# Patient Record
Sex: MALE
[De-identification: names, ages, dates, MRNs, and addresses within clinical notes are randomized per-mention and may not be internally consistent; named-entity substitution may affect disease eponyms.]

---

## 2020-01-03 ENCOUNTER — HOSPITAL ENCOUNTER (EMERGENCY)
Dept: HOSPITAL 97 - ER | Age: 45
Discharge: HOME | End: 2020-01-03
Payer: COMMERCIAL

## 2020-01-03 VITALS — OXYGEN SATURATION: 99 % | TEMPERATURE: 98.7 F | DIASTOLIC BLOOD PRESSURE: 85 MMHG | SYSTOLIC BLOOD PRESSURE: 132 MMHG
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
## 2020-01-03 DIAGNOSIS — S30.0XXA: Primary | ICD-10-CM
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
## 2020-01-03 DIAGNOSIS — I10: ICD-10-CM

## 2020-01-03 PROCEDURE — 72170 X-RAY EXAM OF PELVIS: CPT

## 2020-01-03 PROCEDURE — 99284 EMERGENCY DEPT VISIT MOD MDM: CPT

## 2020-01-03 NOTE — RAD REPORT
EXAM DESCRIPTION:  RAD - Pelvis - 1/3/2020 4:53 pm

 

CLINICAL HISTORY:  Pelvic pain status post injury

 

FINDINGS:  No fracture or dislocation is seen.

 

If the patient continues to have symptoms to suggest an occult fracture then MRI would be recommended

## 2020-01-03 NOTE — ER
Nurse's Notes                                                                                     

 [facility]                                                                 

Name: Kiko Montanez                                                                           

Age: 44 yrs                                                                                       

Sex: Male                                                                                         

: 1975                                                                                   

MRN: [id number]                                                                                   

Arrival Date: 2020                                                                          

Time: 15:59                                                                                       

Account#: [id number]                                                                            

Bed 25                                                                                            

Private MD:                                                                                       

Diagnosis: Contusion of lower back and pelvis;Fall on same level from slipping, tripping and      

  stumbling                                                                                       

                                                                                                  

Presentation:                                                                                     

                                                                                             

16:06 Presenting complaint: Patient states: "I fell in the shower, 30-40 minutes ago and I    ss  

      hit right here above my hip and I just want to make sure I didn't mess anything up          

      inside." Also reports that he may have cut a toe on his L foot. Transition of care:         

      patient was not received from another setting of care. Onset of symptoms was 2020. Risk Assessment: Do you want to hurt yourself or someone else? Patient            

      reports no desire to harm self or others. Initial Sepsis Screen: Does the patient meet      

      any 2 criteria? No. Patient's initial sepsis screen is negative. Does the patient have      

      a suspected source of infection? No. Patient's initial sepsis screen is negative. Care      

      prior to arrival: None.                                                                     

16:06 Method Of Arrival: Wheelchair                                                           ss  

16:06 Acuity: GRABIEL 3                                                                           ss  

                                                                                                  

Historical:                                                                                       

- Allergies:                                                                                      

16:09 No Known Allergies;                                                                     ss  

- PMHx:                                                                                           

16:09 Hypertension; Diabetes - IDDM;                                                          ss  

- PSHx:                                                                                           

16:09 L foot;                                                                                 ss  

                                                                                                  

- Immunization history:: Adult Immunizations up to date.                                          

- Social history:: Smoking status: Patient/guardian denies using tobacco.                         

- Ebola Screening: : Patient denies exposure to infectious person Patient denies travel           

  to an Ebola-affected area in the 21 days before illness onset.                                  

                                                                                                  

                                                                                                  

Screenin:35 Abuse screen: Denies threats or abuse. Nutritional screening: No deficits noted.        em  

      Tuberculosis screening: No symptoms or risk factors identified. Fall Risk Fall in past      

      12 months (25 points). Total Flores Fall Scale indicates Low Risk Score (25-44 pts).         

      Side Rails Up X 2 Placed close to Nursing Station Family Present and informed to notify     

      staff if they need to leave bedside.                                                        

                                                                                                  

Assessment:                                                                                       

16:25 General: Appears in no apparent distress. comfortable, Behavior is calm, cooperative,   em  

      appropriate for age. Pain: Complains of pain in pelvis and left foot. Neuro: Level of       

      Consciousness is awake, alert, obeys commands, Oriented to person, place, time,             

      situation, Appropriate for age. Cardiovascular: Capillary refill < 3 seconds Patient's      

      skin is warm and dry. Respiratory: Airway is patent Respiratory effort is even,             

      unlabored, Respiratory pattern is regular, symmetrical. Derm: Skin is intact, is            

      healthy with good turgor, Skin is pink, warm \T\ dry. Musculoskeletal: Capillary refill <   

      3 seconds, Range of motion: intact in all extremities. Injury Description: Abrasion         

      sustained to plantar aspect of left first toe.                                              

17:38 Reassessment: rates pain 9/10, provider notified, new medication orders received.       em  

                                                                                                  

Vital Signs:                                                                                      

16:09 Resp 16; Weight 106.59 kg; Height 6 ft. 0 in. (182.88 cm); Pain 10/10;                  ss  

16:09 Temp 98.7(TE);                                                                          ss  

16:09  / 85; Pulse 90 MON; Pulse Ox 99% on R/A;                                         jp3 

17:28  / 81; Pulse 91; Resp 16 S; Pulse Ox 100% ;                                       ca1 

16:09 Body Mass Index 31.87 (106.59 kg, 182.88 cm)                                            ss  

                                                                                                  

ED Course:                                                                                        

15:59 Patient arrived in ED.                                                                  rg4 

16:08 Triage completed.                                                                       ss  

16:09 Arm band placed on right wrist.                                                         ss  

16:12 Aldo Awad LVN is Primary Nurse.                                                     em  

16:19 Jatin Cui NP is PHCP.                                                           pm1 

16:19 Orlando Gray MD is Attending Physician.                                              pm1 

16:26 Patient maintains SpO2 saturation greater than 95% on room air. Wound care: to          jp3 

      abrasion, located on plantar aspect of left first toe was cleaned with Hibiclens,           

      dressed with Neosporin, band aid. Wound care: was dressed with Patient tolerated well.      

16:28 Safety checks: Family/friend present: yes. Bed in low position. Call light in reach.    jp3 

      Side rails up X 1. Warm blanket given. Verbal reassurance given. Pulse ox on. NIBP on.      

16:45 Pelvis XRAY In Process Unspecified.                                                     EDMS

17:54 No provider procedures requiring assistance completed. Patient did not have IV access   em  

      during this emergency room visit.                                                           

                                                                                                  

Administered Medications:                                                                         

17:31 Drug: Norco (7.5 mg-325 mg) 1 tabs {Note: RASS-0.} Route: PO;                           em  

17:57 Follow up: Response: No adverse reaction                                                em  

                                                                                                  

                                                                                                  

Outcome:                                                                                          

17:32 Discharge ordered by MD.                                                                pm1 

17:54 Discharged to home via wheelchair, with family.                                         em  

17:54 Condition: good                                                                             

17:54 Discharge instructions given to patient, family, Instructed on discharge instructions,      

      follow up and referral plans. medication usage, Demonstrated understanding of               

      instructions, follow-up care, medications, Prescriptions given X 1.                         

17:57 Patient left the ED.                                                                    em  

                                                                                                  

Signatures:                                                                                       

Dispatcher [employer]                           EDAldo Tavarez, NOHELIAN                       LVN  Sharlene Auguste, RN                      RN   ss                                                   

Jatin Cui, PAMELA                    NP   pm1                                                  

aMrisa Sosa                                 rg4                                                  

Teddy Mcnamara                              jp3                                                  

Yadi Mendez RN                        RN   ca1                                                  

                                                                                                  

**************************************************************************************************

## 2020-01-03 NOTE — EDPHYS
Physician Documentation                                                                           

 [facility]                                                                 

Name: Kiko Montanez                                                                           

Age: 44 yrs                                                                                       

Sex: Male                                                                                         

: 1975                                                                                   

MRN: [id number]                                                                                   

Arrival Date: 2020                                                                          

Time: 15:59                                                                                       

Account#: [id number]                                                                            

Bed 25                                                                                            

Private MD:                                                                                       

ED Physician Orlando Gray                                                                       

HPI:                                                                                              

                                                                                             

17:18 This 44 yrs old  Male presents to ER via Wheelchair with complaints of Fall     pm1 

      Injury.                                                                                     

17:18 Details of fall: The patient fell from an upright position, while standing, and struck  pm1 

      a tile surface. Onset: The symptoms/episode began/occurred just prior to arrival.           

      Associated injuries: The patient sustained right lower back - iliac crest. Severity of      

      symptoms: in the emergency department the symptoms are actually worse. The patient has      

      not experienced similar symptoms in the past. The patient has not recently seen a           

      physician. Patient just got out of the shower and slipped on the floor landing on the       

      right side of lower back. Presenting with cut to left great toe and pain to right lower     

      back. Patient able to walk without any difficulty and no pain to either hips. No            

      headache, head injury, neck pain, or LOC.                                                   

                                                                                                  

Historical:                                                                                       

- Allergies:                                                                                      

16:09 No Known Allergies;                                                                     ss  

- PMHx:                                                                                           

16:09 Hypertension; Diabetes - IDDM;                                                          ss  

- PSHx:                                                                                           

16:09 L foot;                                                                                 ss  

                                                                                                  

- Immunization history:: Adult Immunizations up to date.                                          

- Social history:: Smoking status: Patient/guardian denies using tobacco.                         

- Ebola Screening: : Patient denies exposure to infectious person Patient denies travel           

  to an Ebola-affected area in the 21 days before illness onset.                                  

                                                                                                  

                                                                                                  

ROS:                                                                                              

17:18 Constitutional: Negative for fever, chills, and weight loss, Neck: Negative for injury, pm1 

      pain, and swelling, Cardiovascular: Negative for chest pain, palpitations, and edema,       

      Respiratory: Negative for shortness of breath, cough, wheezing, and pleuritic chest         

      pain, Abdomen/GI: Negative for abdominal pain, nausea, vomiting, diarrhea, and              

      constipation.                                                                               

17:18 : Negative for injury, bleeding, discharge, and swelling, MS/Extremity: Negative for      

      injury and deformity, Neuro: Negative for headache, weakness, numbness, tingling, and       

      seizure.                                                                                    

17:18 Back: Positive for of the right low back.                                                   

17:18 Skin: Positive for laceration(s), of the plantar aspect of left first toe.                  

                                                                                                  

Exam:                                                                                             

17:18 Constitutional:  This is a well developed, well nourished patient who is awake, alert,  pm1 

      and in no acute distress. Head/Face:  Normocephalic, atraumatic. Neck:  Trachea             

      midline, no thyromegaly or masses palpated, and no cervical lymphadenopathy.  Supple,       

      full range of motion without nuchal rigidity, or vertebral point tenderness.  No            

      Meningismus. Chest/axilla:  Normal chest wall appearance and motion.  Nontender with no     

      deformity.  No lesions are appreciated. Cardiovascular:  Regular rate and rhythm with a     

      normal S1 and S2.  No gallops, murmurs, or rubs.  Normal PMI, no JVD.  No pulse             

      deficits. Respiratory:  Lungs have equal breath sounds bilaterally, clear to                

      auscultation and percussion.  No rales, rhonchi or wheezes noted.  No increased work of     

      breathing, no retractions or nasal flaring. Abdomen/GI:  Soft, non-tender, with normal      

      bowel sounds.  No distension or tympany.  No guarding or rebound.  No evidence of           

      tenderness throughout.                                                                      

17:18 Back: normal spinal alignment noted, vertebral tenderness, is not appreciated,              

      tenderness to right posterior iliac crest.                                                  

17:18 Skin: Appearance: normal except for affected area, injury, laceration(s), that can be       

      described as clean, no foreign body, linear, small paper cut like appearance to plantar     

      aspect of posterior left great toe.                                                         

17:30 Musculoskeletal/extremity: Extremities: Patient able to move both hips and legs full    pm1 

      range of motion without any pain passively to hips or legs.                                 

                                                                                                  

Vital Signs:                                                                                      

16:09 Resp 16; Weight 106.59 kg; Height 6 ft. 0 in. (182.88 cm); Pain 10/10;                  ss  

16:09 Temp 98.7(TE);                                                                          ss  

16:09  / 85; Pulse 90 MON; Pulse Ox 99% on R/A;                                         jp3 

17:28  / 81; Pulse 91; Resp 16 S; Pulse Ox 100% ;                                       ca1 

16:09 Body Mass Index 31.87 (106.59 kg, 182.88 cm)                                            ss  

                                                                                                  

MDM:                                                                                              

16:33 Patient medically screened.                                                             pm1 

17:18 ED course: Patient took pain medication prior to arrival. Offered pain medication.      pm1 

      Patient refused.                                                                            

17:29 Data reviewed: vital signs. Data interpreted: Pulse oximetry: on room air is 100 %.     pm1 

      Interpretation: normal. Counseling: I had a detailed discussion with the patient and/or     

      guardian regarding: the historical points, exam findings, and any diagnostic results        

      supporting the discharge/admit diagnosis, radiology results, the need for outpatient        

      follow up, to return to the emergency department if symptoms worsen or persist or if        

      there are any questions or concerns that arise at home.                                     

                                                                                                  

                                                                                             

16:32 Order name: Pelvis XRAY; Complete Time: 17:18                                           pm1 

                                                                                                  

Administered Medications:                                                                         

17:31 Drug: Norco (7.5 mg-325 mg) 1 tabs {Note: RASS-0.} Route: PO;                           em  

17:57 Follow up: Response: No adverse reaction                                                em  

                                                                                                  

                                                                                                  

Disposition:                                                                                      

20 17:32 Discharged to Home. Impression: Contusion of lower back and pelvis, Fall on        

  same level from slipping, tripping and stumbling.                                               

- Condition is Stable.                                                                            

- Discharge Instructions: Contusion, Fall Prevention in the Home.                                 

- Prescriptions for Tylenol- Codeine #3 300-30 mg Oral Tablet - take 2 tablets by ORAL            

  route every 6 hours As needed; 20 tablet.                                                       

- Medication Reconciliation Form, Thank You Letter, Antibiotic Education, Prescription            

  Opioid Use form.                                                                                

- Follow up: Emergency Department; When: As needed; Reason: Worsening of condition.               

  Follow up: Private Physician; When: 2 - 3 days; Reason: Recheck today's complaints,             

  Continuance of care, Re-evaluation by your physician.                                           

- Problem is new.                                                                                 

- Symptoms have improved.                                                                         

                                                                                                  

                                                                                                  

                                                                                                  

Addendum:                                                                                         

2020                                                                                        

     06:39 Co-signature as Attending Physician, Orlando Gray MD I agree with the assessment and   k
dr

           plan of care.                                                                          

                                                                                                  

Signatures:                                                                                       

Dispatcher [employer]                           EDMS                                                 

Orlando Gray MD MD   kdr                                                  

Aldo Awad, LVN                       LVN  em                                                   

Sharlene Cooper, HERMINIA                      RN   ss                                                   

Jatin Cui NP                    NP   pm1                                                  

                                                                                                  

Corrections: (The following items were deleted from the chart)                                    

                                                                                             

17:57 17:32 2020 17:32 Discharged to Home. Impression: Contusion of lower back and      em  

      pelvis; Fall on same level from slipping, tripping and stumbling. Condition is Stable.      

      Forms are Medication Reconciliation Form, Thank You Letter, Antibiotic Education,           

      Prescription Opioid Use. Follow up: Emergency Department; When: As needed; Reason:          

      Worsening of condition. Follow up: Private Physician; When: 2 - 3 days; Reason: Recheck     

      today's complaints, Continuance of care, Re-evaluation by your physician. Problem is        

      new. Symptoms have improved. pm1                                                            

                                                                                                  

**************************************************************************************************